# Patient Record
Sex: MALE | Race: BLACK OR AFRICAN AMERICAN | NOT HISPANIC OR LATINO | Employment: PART TIME | URBAN - METROPOLITAN AREA
[De-identification: names, ages, dates, MRNs, and addresses within clinical notes are randomized per-mention and may not be internally consistent; named-entity substitution may affect disease eponyms.]

---

## 2017-02-28 ENCOUNTER — HOSPITAL ENCOUNTER (EMERGENCY)
Facility: HOSPITAL | Age: 52
Discharge: HOME | End: 2017-02-28
Attending: EMERGENCY MEDICINE | Admitting: EMERGENCY MEDICINE

## 2017-02-28 VITALS
OXYGEN SATURATION: 100 % | HEART RATE: 70 BPM | HEIGHT: 69 IN | TEMPERATURE: 97.9 F | WEIGHT: 200 LBS | RESPIRATION RATE: 18 BRPM | BODY MASS INDEX: 29.62 KG/M2 | SYSTOLIC BLOOD PRESSURE: 150 MMHG | DIASTOLIC BLOOD PRESSURE: 103 MMHG

## 2017-02-28 DIAGNOSIS — G54.0 THORACIC OUTLET SYNDROME: Primary | ICD-10-CM

## 2017-02-28 LAB — GLUCOSE BLDC GLUCOMTR-MCNC: 267 MG/DL (ref 70–130)

## 2017-02-28 PROCEDURE — 99283 EMERGENCY DEPT VISIT LOW MDM: CPT

## 2017-02-28 PROCEDURE — 82962 GLUCOSE BLOOD TEST: CPT

## 2017-02-28 RX ORDER — CLOPIDOGREL BISULFATE 75 MG/1
75 TABLET ORAL DAILY
COMMUNITY

## 2017-02-28 RX ORDER — OXYCODONE HYDROCHLORIDE AND ACETAMINOPHEN 5; 325 MG/1; MG/1
1 TABLET ORAL EVERY 6 HOURS PRN
Qty: 20 TABLET | Refills: 0 | Status: SHIPPED | OUTPATIENT
Start: 2017-02-28

## 2017-02-28 RX ORDER — OXYCODONE HYDROCHLORIDE AND ACETAMINOPHEN 5; 325 MG/1; MG/1
1 TABLET ORAL EVERY 6 HOURS PRN
Qty: 20 TABLET | Refills: 0 | Status: SHIPPED | OUTPATIENT
Start: 2017-02-28 | End: 2017-02-28

## 2017-02-28 RX ORDER — HYDROCODONE BITARTRATE AND ACETAMINOPHEN 7.5; 325 MG/1; MG/1
1 TABLET ORAL EVERY 6 HOURS PRN
Qty: 20 TABLET | Refills: 0 | Status: SHIPPED | OUTPATIENT
Start: 2017-02-28

## 2017-03-21 ENCOUNTER — TRANSCRIBE ORDERS (OUTPATIENT)
Dept: ADMINISTRATIVE | Facility: HOSPITAL | Age: 52
End: 2017-03-21

## 2017-03-21 DIAGNOSIS — M50.30 BULGING OF CERVICAL INTERVERTEBRAL DISC: Primary | ICD-10-CM

## 2017-04-03 ENCOUNTER — TRANSCRIBE ORDERS (OUTPATIENT)
Dept: ADMINISTRATIVE | Facility: HOSPITAL | Age: 52
End: 2017-04-03

## 2017-04-03 DIAGNOSIS — M50.30 BULGING OF CERVICAL INTERVERTEBRAL DISC: Primary | ICD-10-CM

## 2017-04-04 ENCOUNTER — HOSPITAL ENCOUNTER (OUTPATIENT)
Dept: MRI IMAGING | Facility: HOSPITAL | Age: 52
Discharge: HOME | End: 2017-04-04
Attending: SURGERY | Admitting: SURGERY

## 2017-04-04 DIAGNOSIS — M50.30 BULGING OF CERVICAL INTERVERTEBRAL DISC: ICD-10-CM

## 2017-04-04 PROCEDURE — 72141 MRI NECK SPINE W/O DYE: CPT

## 2017-04-18 ENCOUNTER — HOSPITAL ENCOUNTER (EMERGENCY)
Facility: HOSPITAL | Age: 52
Discharge: HOME | End: 2017-04-18
Attending: EMERGENCY MEDICINE | Admitting: EMERGENCY MEDICINE

## 2017-04-18 VITALS
SYSTOLIC BLOOD PRESSURE: 173 MMHG | HEIGHT: 69 IN | DIASTOLIC BLOOD PRESSURE: 108 MMHG | HEART RATE: 84 BPM | TEMPERATURE: 98.4 F | RESPIRATION RATE: 18 BRPM | BODY MASS INDEX: 28.58 KG/M2 | OXYGEN SATURATION: 95 % | WEIGHT: 193 LBS

## 2017-04-18 DIAGNOSIS — S16.1XXA CERVICAL STRAIN, INITIAL ENCOUNTER: Primary | ICD-10-CM

## 2017-04-18 PROCEDURE — 99283 EMERGENCY DEPT VISIT LOW MDM: CPT

## 2017-04-18 RX ORDER — ONDANSETRON 4 MG/1
4 TABLET, FILM COATED ORAL EVERY 8 HOURS PRN
Qty: 15 TABLET | Refills: 0 | Status: SHIPPED | OUTPATIENT
Start: 2017-04-18

## 2017-04-18 RX ORDER — PREDNISONE 50 MG/1
50 TABLET ORAL DAILY
Qty: 5 TABLET | Refills: 0 | Status: SHIPPED | OUTPATIENT
Start: 2017-04-18

## 2017-04-18 RX ORDER — CYCLOBENZAPRINE HCL 10 MG
10 TABLET ORAL 3 TIMES DAILY PRN
Qty: 20 TABLET | Refills: 0 | Status: SHIPPED | OUTPATIENT
Start: 2017-04-18

## 2017-04-18 RX ORDER — HYDROCODONE BITARTRATE AND ACETAMINOPHEN 7.5; 325 MG/1; MG/1
1 TABLET ORAL EVERY 6 HOURS PRN
Qty: 15 TABLET | Refills: 0 | Status: SHIPPED | OUTPATIENT
Start: 2017-04-18

## 2017-04-18 NOTE — ED PROVIDER NOTES
EMERGENCY DEPARTMENT ENCOUNTER    CHIEF COMPLAINT  Chief Complaint: Neck Pain  History given by: Patient  History limited by: Nothing  Room Number: 07/07  PMD: Cam Yap MD      HPI:  Pt is a 51 y.o. male who presents to the ED with a soft collar in place complaining of persistent, atraumatic cervical pain which began approximately three weeks ago. He reports that the pain intermittently radiates to the center of his upper back. Patient states that the He denies any radiation of pain down his upper extremities but he has chronic numbness of his left arm. Patient denies incontinence of bowel or bladder, saddle anesthesia, focal weakness, tingling, nausea, vomiting, visual disturbances, fever, chills or HA. The patient reportedly had an MRI two weeks ago and he was instructed to follow up with a Neurosurgeon. Patient denies taking anything for pain PTA. No other complaints at this time.      Duration:  3 weeks  Onset: Gradual  Timing: Constat  Location: Cervical   Radiation: Upper back  Quality: Sharp  Intensity/Severity: Moderate  Progression: Worsening  Associated Symptoms: Neck pain, numbness  Aggravating Factors: Palpation, movment  Alleviating Factors: Rest  Previous Episodes: Yes, seen in ER for similar symptoms  Treatment before arrival: None mentioned    PAST MEDICAL HISTORY  Active Ambulatory Problems     Diagnosis Date Noted   • Vascular thoracic outlet syndrome 10/03/2016   • DM (diabetes mellitus) 10/03/2016   • Hypertension 10/03/2016   • Anxiety 10/03/2016   • PTSD (post-traumatic stress disorder) 10/03/2016   • OA (osteoarthritis) 10/03/2016   • Personal history of DVT (deep vein thrombosis) 10/03/2016   • MAURA (obstructive sleep apnea) 10/03/2016     Resolved Ambulatory Problems     Diagnosis Date Noted   • No Resolved Ambulatory Problems     Past Medical History:   Diagnosis Date   • Bursitis, knee    • Claustrophobia    • Contusional brain injury    • Diabetes mellitus    • Diabetic  neuropathy    • Heart murmur    • History of panic attacks    • Hx of blood clots    • Hypertension    • Irregular heart rate    • Limited joint range of motion (ROM)    • Lung nodule    • PTSD (post-traumatic stress disorder)    • Sleep apnea    • Thoracic outlet syndrome        PAST SURGICAL HISTORY  Past Surgical History:   Procedure Laterality Date   • EPIDIDYMIS SURGERY     • FIRST RIB RESECTION Left 10/3/2016    Procedure: LEFT FIRST RIB RESECTION;  Surgeon: Mario Jones MD;  Location: [facility] OR;  Service:    • HAND DEBRIDEMENT Right    • PATELLA SURGERY      RT   • TENDON REPAIR      RT ANKLE AND FOOT   • THROMBECTOMY         FAMILY HISTORY  History reviewed. No pertinent family history.    SOCIAL HISTORY  Social History     Social History   • Marital status: Single     Spouse name: N/A   • Number of children: N/A   • Years of education: N/A     Occupational History   • Not on file.     Social History Main Topics   • Smoking status: Never Smoker   • Smokeless tobacco: Not on file   • Alcohol use No   • Drug use: No      Comment: HX COCAINE AND MARIJUANA USE, LAST USED 12 YEAR AGO   • Sexual activity: Defer     Other Topics Concern   • Not on file     Social History Narrative       ALLERGIES  Metformin and related and Tetracyclines & related    REVIEW OF SYSTEMS  Review of Systems   Constitutional: Negative for chills.   HENT: Negative for congestion, rhinorrhea and sore throat.    Eyes: Negative for pain.   Respiratory: Negative for cough and shortness of breath.    Cardiovascular: Negative for chest pain, palpitations and leg swelling.   Gastrointestinal: Negative for abdominal pain, diarrhea, nausea and vomiting.   Genitourinary: Negative for difficulty urinating, dysuria, flank pain and frequency.   Musculoskeletal: Positive for neck pain. Negative for myalgias and neck stiffness.   Skin: Negative for rash.   Neurological: Positive for numbness. Negative for dizziness, speech difficulty, weakness,  light-headedness and headaches.   Psychiatric/Behavioral: Negative.    All other systems reviewed and are negative.      PHYSICAL EXAM  ED Triage Vitals   Temp Heart Rate Resp BP SpO2   04/18/17 0522 04/18/17 0522 04/18/17 0522 04/18/17 0522 04/18/17 0522   98.4 °F (36.9 °C) 89 22 186/104 100 %      Temp src Heart Rate Source Patient Position BP Location FiO2 (%)   -- 04/18/17 0522 04/18/17 0522 04/18/17 0522 --    Monitor Sitting Left arm        Physical Exam   Constitutional: He is oriented to person, place, and time and well-developed, well-nourished, and in no distress.   HENT:   Head: Normocephalic and atraumatic.   Eyes: EOM are normal. Pupils are equal, round, and reactive to light.   Neck: Normal range of motion. Neck supple.   Cardiovascular: Normal rate, regular rhythm and normal heart sounds.    Pulmonary/Chest: Effort normal and breath sounds normal. No respiratory distress.   Abdominal: Soft. There is no tenderness. There is no rebound and no guarding.   Musculoskeletal: He exhibits no edema.        Cervical back: He exhibits decreased range of motion and tenderness.   Neurological: He is alert and oriented to person, place, and time. He has normal sensation and normal strength.   Skin: Skin is warm and dry.   Psychiatric: Mood and affect normal.   Nursing note and vitals reviewed.      PROCEDURES  Procedures      PROGRESS AND CONSULTS  ED Course     10:02  Discussed case with  and he agrees with the treatment plan.     10:24  Rechecked patient and they are resting. Discussed plan to discharge the patient home and recommended f/u with  (Neuro) as directed. Patient agrees with the plan and all questions were addressed.     Latest vital signs   BP- (!) 164/109 HR- 89 Temp- 98.4 °F (36.9 °C) O2 sat- 95%      MEDICAL DECISION MAKING  Results were reviewed/discussed with the patient and they were also made aware of online access. Pt also made aware that some labs, such as cultures, will not  be resulted during ER visit and follow up with PMD is necessary.     MDM  Number of Diagnoses or Management Options  Cervical strain, initial encounter:   Diagnosis management comments: Pt has had no recent trauma.  He has no neuro deficits.  Will have close neurosurgery follow-up       Amount and/or Complexity of Data Reviewed  Decide to obtain previous medical records or to obtain history from someone other than the patient: yes  Review and summarize past medical records: yes (Thoracic outlet surgery 10/3/17 by . 4/17 MRI L spine showed no focal herniation and multi-level disc degeneration. First rib resection 10/3/17)           DIAGNOSIS  Final diagnoses:   Cervical strain, initial encounter       DISPOSITION  DISCHARGE    Patient discharged in stable condition.    Reviewed implications of results, diagnosis, meds, responsibility to follow up, warning signs and symptoms of possible worsening, potential complications and reasons to return to ER, including worsening neck pain.     Patient/Family voiced understanding of above instructions.    Discussed plan for discharge, as there is no emergent indication for admission.  Pt/family is agreeable and understands need for follow up and repeat testing.  Pt is aware that discharge does not mean that nothing is wrong but it indicates no emergency is present that requires admission and they must continue care with follow-up as given below or physician of their choice.     FOLLOW-UP  Campbell Caicedo MD  [address] [id number] [phone]    Schedule an appointment as soon as possible for a visit      Cam Yap MD  [address] [id number] [phone]    Schedule an appointment as soon as possible for a visit           Medication List      New Prescriptions          cyclobenzaprine 10 MG tablet   Commonly known as:  FLEXERIL   Take 1 tablet by mouth 3 (Three) Times a Day As Needed for Muscle Spasms.       ondansetron 4  MG tablet   Commonly known as:  ZOFRAN   Take 1 tablet by mouth Every 8 (Eight) Hours As Needed for Nausea or   Vomiting.       predniSONE 50 MG tablet   Commonly known as:  DELTASONE   Take 1 tablet by mouth Daily.         Stop          ibuprofen 800 MG tablet   Commonly known as:  ADVIL,MOTRIN       losartan 25 MG tablet   Commonly known as:  COZAAR       oxyCODONE-acetaminophen 5-325 MG per tablet   Commonly known as:  PERCOCET       sennosides-docusate sodium 8.6-50 MG tablet   Commonly known as:  SENOKOT-S           Latest Documented Vital Signs:  As of 10:25 AM  BP- (!) 164/109 HR- 89 Temp- 98.4 °F (36.9 °C) O2 sat- 95%    --  Documentation assistance provided by bisi Crane for Leroy Mata PA-C.  Information recorded by the scribe was done at my direction and has been verified and validated by me.                Diego Crane  04/18/17 1032       ESCOBAR Lora  04/18/17 [id number]

## 2017-04-18 NOTE — ED TRIAGE NOTES
Pt ambulated to Triage Room 1 with c/o neck pain that radiates to his back starting three weeks ago.  Pt states that he did have thoracic outlet surgery on October 3, 2016.  Pt did have an MRI completed two weeks ago and was advised that he needed to follow up with a neurologist.  Pt was seen in the ED a month ago for the same issue and was discharged with a soft neck collar, which he presented with today.  Pt states that the pain has since worsened.  Pt denies loss in control of bowels and/or bladder.  Alert and oriented X4, respirations are even and unlabored, chest rise and fall is equal in expansion.  Pt does appear to be experiencing some discomfort, but does not appear to be in distress at this time.

## 2017-04-18 NOTE — ED PROVIDER NOTES
Pt presents to the ED c/o neck pain over the past 3 weeks. Pt has been wearing a C-collar for several weeks and had an MRI of his C-spine two weeks ago. Pt had thoracic outlet surgery in Oct 2016. Upon exam, pt's heart is regular rate and rhythm without murmur. His lungs are clear. There is tenderness and muscle spasms of upper T-spine. There is also muscle spasm of L trapezius and levator scapula. Pt's R TM shows no erythema or fluid. He has equal  strengths. I agree with the plan for pain management and referral to neurosurgery for outpatient f/u.    I supervised care provided by the midlevel provider.    We have discussed this patient's history, physical exam, and treatment plan.   I have reviewed the note and personally saw and examined the patient and agree with the plan of care.    Documentation assistance provided by bisi Donald for Dr. Roche.  Information recorded by the scribe was done at my direction and has been verified and validated by me.     Gael Donald  04/18/17 [id number]       Radu Roche MD  04/18/17 [id number]

## 2018-05-21 ENCOUNTER — TRANSCRIBE ORDERS (OUTPATIENT)
Dept: ADMINISTRATIVE | Facility: HOSPITAL | Age: 53
End: 2018-05-21

## 2018-05-21 DIAGNOSIS — M54.2 NECK PAIN: Primary | ICD-10-CM

## 2018-05-30 ENCOUNTER — APPOINTMENT (OUTPATIENT)
Dept: MRI IMAGING | Facility: HOSPITAL | Age: 53
End: 2018-05-30
Attending: SURGERY